# Patient Record
Sex: FEMALE
[De-identification: names, ages, dates, MRNs, and addresses within clinical notes are randomized per-mention and may not be internally consistent; named-entity substitution may affect disease eponyms.]

---

## 2022-05-20 ENCOUNTER — HOSPITAL ENCOUNTER (INPATIENT)
Dept: HOSPITAL 47 - 3MHU | Age: 37
LOS: 5 days | Discharge: HOME | DRG: 885 | End: 2022-05-25
Attending: PSYCHIATRY & NEUROLOGY | Admitting: PSYCHIATRY & NEUROLOGY
Payer: MEDICAID

## 2022-05-20 DIAGNOSIS — F79: ICD-10-CM

## 2022-05-20 DIAGNOSIS — F17.210: ICD-10-CM

## 2022-05-20 DIAGNOSIS — Z28.310: ICD-10-CM

## 2022-05-20 DIAGNOSIS — Z53.20: ICD-10-CM

## 2022-05-20 DIAGNOSIS — Z91.51: ICD-10-CM

## 2022-05-20 DIAGNOSIS — F12.10: ICD-10-CM

## 2022-05-20 DIAGNOSIS — F14.10: ICD-10-CM

## 2022-05-20 DIAGNOSIS — Z91.19: ICD-10-CM

## 2022-05-20 DIAGNOSIS — Z91.14: ICD-10-CM

## 2022-05-20 DIAGNOSIS — F25.0: Primary | ICD-10-CM

## 2022-05-20 DIAGNOSIS — Z60.2: ICD-10-CM

## 2022-05-20 DIAGNOSIS — K21.9: ICD-10-CM

## 2022-05-20 PROCEDURE — 80061 LIPID PANEL: CPT

## 2022-05-20 PROCEDURE — 83036 HEMOGLOBIN GLYCOSYLATED A1C: CPT

## 2022-05-20 SDOH — SOCIAL STABILITY - SOCIAL INSECURITY: PROBLEMS RELATED TO LIVING ALONE: Z60.2

## 2022-05-21 LAB
CHOLEST SERPL-MCNC: 194 MG/DL (ref 0–200)
HDLC SERPL-MCNC: 26.3 MG/DL (ref 40–60)
LDLC SERPL CALC-MCNC: 138.7 MG/DL (ref 0–131)
TRIGL SERPL-MCNC: 145 MG/DL (ref 0–149)
VLDLC SERPL CALC-MCNC: 29 MG/DL (ref 5–40)

## 2022-05-21 RX ADMIN — PANTOPRAZOLE SODIUM SCH MG: 40 TABLET, DELAYED RELEASE ORAL at 08:43

## 2022-05-21 NOTE — P.CONS
History of Present Illness





- Reason for Consult


Consult date: 05/20/22





- History of Present Illness





Patient is a 36-year-old female, transferred from [facility] who was seen in

the mental health.  The patient was paranoid and not forthcoming with 

information.  She reports having a history of psychosis and that she is 

currently trying to get stabilized on her medications.  She denied physical 

complaints at the time of interview.  Denied chest discomfort, shortness of 

breath, fever, chills, cough, nausea, vomiting, abdominal pain, diarrhea.  

Reports using one pack of cigarettes daily.  Denied substance use.





Review of systems:


Pertinent positives and negatives as discussed in HPI, a complete review of 

systems was performed and all other systems are negative.





Physical examination:


General: non toxic, no distress, appears at stated age, normal weight


Derm: Refused examination


Head: Refused examination


Eyes: Refused examination


ENT: Refused examination


Neck: Refused examination


Mouth: no lip lesion


Cardiovascular: Refused examination


Lungs: Refused examination


Abdominal: Refused examination


Ext: Refused examination


Neuro: Refused examination


Psych: Paranoid





Assessment/plan





GERD


-Continue with Protonix





Tobacco abuse


-Nicotine patch when necessary


-Advised on importance of cessation





Psychosis


-As per psychiatry





Thank you for allowing us to participate in the care of this patient.  We will 

follow peripherally.  Do not hesitate to contact us with questions.  Someone can

be reached from the [facility] hospitalist group at all hours of the day 

at [phone].





Medications and Allergies


                                    Allergies











Allergy/AdvReac Type Severity Reaction Status Date / Time


 


No Known Allergies Allergy   Verified 05/20/22 19:30














Physical Exam


Vitals: 


                                Intake and Output











 05/20/22 05/20/22 05/20/22





 06:59 14:59 22:59


 


Other:   


 


  Weight   78.6 kg

## 2022-05-21 NOTE — P.HP
Psychiatric H&P





- .


H&P Date: 05/21/22


History & Physical: 


IDENTIFYING DATA: She is a 36-year-old single  female who has a history

of a schizophrenia.  She presented involuntarily on transfer from [facility].





HISTORY OF PRESENT ILLNESS: The accompanying petition read "paranoid delusions 

and tangential thoughts.  Has been noncompliant with medications, has history of

schizophrenia."  I reviewed the medical record and interviewed the patient.  She

was anxious, paranoid and guarded.  She appeared to have difficulty explaining 

the circumstances that led to her hospitalizations.  Apparently she called 

emergency services from her apartment because she was feeling unwell.  She gave 

a disjointed history about not being able to tolerate Abilify, taking Haldol and

at some point stopping the Haldol.  She appeared become more anxious and guarded

as as I ask questions to clarify her history.





She told the EPS nurse that she had been mixing alcohol with her psychotropic 

medication and has been more paranoid than normal.  She felt that "people were 

following her" and that she had "walk to the hospital get help."





During the interview she denied that she was drinking alcohol or drinking 

alcohol with her psychotropic medications. 





She perseverated about obtaining transportation so that she could return home 

from the hospital.  She is also worried about losing her recent job at a local 

[employer].  She is afraid that if she loses his job she would not be 

able to pay her rent and may lose her apartment.





Sshe express feelings of depression but denied feeling hopeless or worthless.  

She was anxious but denied persistent uncontrolled anxiety.  She was extremely 

guarded about her psychotic symptoms and would not talk about whether or not she

was experiencing hallucinations, ideas reference, thought insertion or thought 

control.





PAST PSYCHIATRIC HISTORY: She has a history of schizophrenia with onset in the 

early 20s.  She has had approximately 5 psychiatric hospitalizations; the last 

was in September 2018.  She is active with [facility].  She became increasingly anxious and paranoid when I inquired about 

history of suicide attempts or gestures.





PAST MEDICAL HISTORY: Denied a history of major medical problems.





ALLERGIES: Known drug ALLERGIES





SUBSTANCE USE HISTORY: She is a history of alcohol use problems but alleges that

she is spent abstinent "for a while."  She was in one rehab program and was 

prescribed Antabuse.  She denied use of drugs.





FAMILY PSYCHIATRIC/SUBSTANCE USE HISTORY: She is unaware of a family history of 

mental illness





LEGAL HISTORY: Denied





SOCIAL HISTORY: Her parents  when she was 10 years old.  She is raised by

her mother.  She is estranged from her father and her older brother.  She is 

single and had one child out of wedlock.  She lost custody of the child was [age] 

year old.  She lives alone in an apartment.  She does not receive Social 

Security income.  She receives financial support from her mother.





MENTAL STATUS EXAM: She presented as a moderately obese 36-year-old  

female who is anxious, guarded and suspicious.  She made intermittent eye 

contact and frequently scanned the room as though she were responding to 

internal stimuli.  She is able to concentrate and attend to the interview.  She 

had no prominent physical abnormalities.  She is anxious facial expression.  She

was alert and oriented to person, place and time.  She had psychomotor 

retardation but no abnormal involuntary movements.  Her speech was spontaneous 

with a marked decrease in rate, volume and rhythm.  Her affect was paranoid and 

anxious.  She denied suicidal ideation and death wishes.  She denied homicidal 

ideation.  She did not express feelings of hopelessness, helplessness or 

worthlessness.  She ruminated about this hospitalization, transportation home 

and the status of her employment.  She did not express clear ideas of reference 

or delusional thoughts.  She did not express paranoid ideation.  Her thinking 

was very concrete but her associations are goal-directed.  At times she appeared

to be blocking.  She denied hallucinations but appeared to be responding to 

internal stimuli.  Global impression of intellect is average.  She is aware of 

illness and need for treatment.





STRENGTHS: Stable housing, supportive family, good physical health, engagement 

with community mental health services





WEAKNESSES: Chronic mental illness, questionable compliance with psychotropic 

medications, possible alcohol use problems





IMPRESSION: She is a 36-year-old single  female who has a history of 

schizophrenia.  She presented to the psychiatric unit involuntarily on transfer 

from [facility] emergency room where she presented anxious, paranoid and 

delusional.  She was obviously paranoid and was not forthcoming about her 

symptoms.  I suspect she is having an exacerbation of her illness likely related

to noncompliance with treatment.  Alcohol might be compounding factor.  She'll 

benefit from continued inpatient treatment with combination of 

psychopharmacology and multimodal therapy.





PRINCIPLE DIAGNOSIS: Schizophrenia chronic multiple hospitalizations with acute 

exacerbation, rule out alcohol use disorder





RECOMMENDATION: Admit voluntarily to the psychiatric unit.  Safety precautions. 

Consult medicine for initial physical 7 medical history.   

completed initial psychosocial assessment coordinate discharge and aftercare 

services.  Continue Haldol 10 mg daily, Haldol and/or Ativan when necessary IM 

for anxiety, agitation and increased psychosis.  Obtain collateral information. 

Encourage participation in therapeutic groups and activities.  Evaluate clinical

status response to treatment daily basis.





                                    Allergies











Allergy/AdvReac Type Severity Reaction Status Date / Time


 


No Known Allergies Allergy   Verified 05/20/22 19:30








                                   Vital Signs











Temp  98.9 F   05/21/22 06:56


 


Pulse  107 H  05/21/22 06:56


 


Resp      


 


BP  128/77   05/21/22 06:56


 


Pulse Ox  98   05/21/22 06:56








                                 Intake & Output











 05/20/22 05/21/22 05/21/22





 18:59 06:59 18:59


 


Weight  78.6 kg 











05/21/22 11:43

## 2022-05-22 VITALS — RESPIRATION RATE: 14 BRPM

## 2022-05-22 RX ADMIN — PANTOPRAZOLE SODIUM SCH MG: 40 TABLET, DELAYED RELEASE ORAL at 09:26

## 2022-05-22 NOTE — P.PN
Progress Note - Text


Progress Note Date: 05/22/22


Clinical Problems: Clinical Problems: Schizoaffective disorder bipolar type, 

cannabis use disorder, intellectual disability





Interim history:  I reviewed the medical record and interviewed the patient.  

The patient denied problems or concerns and requested discharge.  She talked 

about wanting to be discharged with the male patient who was recently admitted. 

She was guarded and suspicious but denied such psychotic symptoms as 

hallucinations, ideas reference thought insertion etc.  Show no behavioral 

problems or episodes of behavioral dyscontrol.  She is attending therapeutic 

groups and activities.  She slept only 2 hours last night.





Mental status exam: She presented as a tall AND groomed  female who was

pleasant on approach.  She made eye contact and appeared to attend to the 

interview.  She had a blunted facial expression.  She showed no abnormality of 

psychomotor activity.  Her speech was spontaneous with decreased rate, rhythm 

and volume.  She was guarded and suspicious and scanned the room during the 

interview.  She denied suicidal ideation and death wishes.  She denied 

experiencing depressive cognitions.  She did not express clear ideas reference 

or paranoid ideation.  Her thinking was concrete and associations appeared goal 

directed.  She denied hallucinations but appeared to be responding to internal 

stimuli.





Assessment: She has no insight or understanding of her illness and need for 

treatment.  She continues to have symptoms of psychosis with paranoia and 

possible auditory hallucinations.





Plan: Continue inpatient treatment.  Safety precautions.  Continue Haldol 10 mg 

daily and Haldol 5 mg by mouth or IM agitation, aggression acute psychosis.  

Encourage continued participation in therapeutic groups and activities.  

Evaluate clinical status response to treatment daily basis.

## 2022-05-23 RX ADMIN — PANTOPRAZOLE SODIUM SCH MG: 40 TABLET, DELAYED RELEASE ORAL at 08:48

## 2022-05-23 NOTE — P.PN
Progress Note - Text


Progress Note Date: 05/23/22





Interval History:


Patient was seen wandering the hallways and was directable and agreeable to 

speak with writer in the office. Currently, the patient is presenting as quite 

tearful during the psychiatric interview.  The patient states that she is upset 

because she has been thinking about her daughter whom she lost custody of.  She 

reports that her daughter's 3 years old.  The patient states that she was 

brought to the hospital for paranoid delusions.  She reports that these are 

well-controlled at this time.  She does admit that she had some paranoid 

thoughts that her neighbor was present on the psychiatric unit.  She is 

currently denying any suicidal or homicidal ideation, intention, and/or plan.  

She is not reporting any auditory or visual hallucinations.  She denies any 

issues regarding her sleep or her appetite.


The patient does report a significant history of substance use.  She reports 

that she uses cocaine approximately twice per week.  She was counseled at length

that this may contribute to her psychotic symptoms and paranoia. Despite 

tachycardia, the patient does not endorse any chest pain, shortness of breath, 

or palpitations.





As per team meeting discussion, the patient reportedly endorse significant 

paranoid delusions while attending group.  She reportedly believed that the 

 running group was involved in taking away the patient's daughter 

and was quite tearful and distraught.  She expresses a desire not to interact 

with the .





Mental Status Exam:


General Appearance: Patient appears to be stated age is alert, directable, and 

cooperative. 


Behavior: Patient is calmly seated without any agitated behavior.  Tearful 

throughout the interview.


Speech: Patient's speech is fluent and nonpressured. 


Mood/Affect: Mood is "sad." Affect is tearful. Labile from happy to sad.   


Suicidality/Homicidality:  Patient denies having any suicidal or homicidal 

ideation, intention,/or plan.


Perceptions: Patient denies any visual hallucinations and denies any auditory 

hallucinations  


Though content/process: The patient does endorse paranoid delusional thought 

content and displayed this during group therapy.  Thought process is goal 

oriented.


Memory and concentration: AOX3, grossly intact for the purposes of this session


Judgment and insight: Poor





                                   Vital Signs











Temp  96.5 F L  05/22/22 00:23


 


Pulse  111 H  05/22/22 00:23


 


Resp  14   05/22/22 00:23


 


BP  140/87   05/22/22 00:23


 


Pulse Ox  98   05/21/22 06:56


 


FiO2      














Assessment


Schizophrenia


Cocaine use disorder





Plan:


-Patient continues to meet criteria for inpatient psychiatric admission for 

symptom stabilization and safety. Patient has signed adult voluntary form and 

medication consent and was placed in patient's chart.


-Medications: 


Increase Haldol to 10 mg by mouth every morning and 5 mg by mouth daily at 

bedtime for management of psychosis.


-When necessary Ativan and Haldol for agitation/aggression.


-SW on board for discharge planning.  Encouraged the patient to participate in 

milieu.

## 2022-05-24 VITALS — SYSTOLIC BLOOD PRESSURE: 141 MMHG | DIASTOLIC BLOOD PRESSURE: 83 MMHG | TEMPERATURE: 98.7 F | HEART RATE: 89 BPM

## 2022-05-24 RX ADMIN — PANTOPRAZOLE SODIUM SCH MG: 40 TABLET, DELAYED RELEASE ORAL at 08:44

## 2022-05-24 NOTE — P.PN
Progress Note - Text


Progress Note Date: 05/24/22








Interval History:


Patient was seen wandering the hallways and was directable and agreeable to 

speak with writer in the office.  Currently, the patient is not reporting any 

suicidal or homicidal ideation, intention, and/or plan.  She is not reporting 

any auditory or visual hallucinations.  She is denying any paranoia or other 

delusions.  Patient has also had a significant decrease in her crying episodes. 

She maintains that she only cries when she is thinking about her daughter.  The 

patient is requesting for discharge.  She expresses no delusional thought 

content today and states that she had no delusional thoughts towards 


the [place] (SW who ran group yesterday) and instead only felt that the social 

worker reminded her of the  who worked on the case that led to her 

daughter being taken away.





Mental Status Exam:


General Appearance: Patient appears to be stated age is alert, directable, and 

cooperative. 


Behavior: Patient is calmly seated without any agitated behavior. 


Speech: Patient's speech is fluent and nonpressured. 


Mood/Affect: Mood is "feeling good" Affect is constricted.


Suicidality/Homicidality:  Patient denies having any suicidal or homicidal 

ideation, intention,/or plan.


Perceptions: Patient denies any visual hallucinations and denies any auditory 

hallucinations  


Though content/process: The patient does endorse paranoid delusional thought 

content and displayed this during group therapy.  Thought process is goal 

oriented.


Memory and concentration: AOX3, grossly intact for the purposes of this session


Judgment and insight: Poor





                                   Vital Signs











Temp  98.7 F   05/24/22 07:22


 


Pulse  89   05/24/22 07:22


 


Resp  14   05/22/22 00:23


 


BP  141/83   05/24/22 07:22


 


Pulse Ox  99   05/24/22 07:22


 


FiO2      

















Assessment


Schizophrenia


Cocaine use disorder





Plan:


-Patient continues to meet criteria for inpatient psychiatric admission for 

symptom stabilization and safety. Patient has signed adult voluntary form and 

medication consent and was placed in patient's chart.


-Medications: 


Continue Haldol 10 mg by mouth every morning and 5 mg by mouth daily at bedtime 

for management of psychosis.  We discussed the possibility of transition to a 

long-acting injectable.  Patient is currently denying need for this at this 

time.


-When necessary Ativan and Haldol for agitation/aggression.


-SW on board for discharge planning.  Encouraged the patient to participate in 

milieu.

## 2022-05-25 RX ADMIN — PANTOPRAZOLE SODIUM SCH MG: 40 TABLET, DELAYED RELEASE ORAL at 07:56

## 2022-05-25 NOTE — P.DS
Providers


Date of admission: 


05/20/22 19:00





Expected date of discharge: 05/25/22


Attending physician: 


Gonsalo Sahu MD





Consults: 





                                        





05/20/22 19:31


Consult Physician Routine 


   Consulting Provider: [facility]


   Consult Reason/Comments: medical management


   Do you want consulting provider notified?: Yes











Primary care physician: 


Stated None








- Discharge Diagnosis(es)


(1) Schizophrenia


Current Visit: Yes   Status: Acute   





(2) Cocaine use disorder


Current Visit: Yes   Status: Chronic   Priority: Medium   


Hospital Course: 





Admission HPI:


Initial psychiatric evaluation was completed by Dr Munoz on 05/21/2022 who 

wrote:


"She is a 36-year-old single  female who has a history of a 

schizophrenia.  She presented involuntarily on transfer from [facility].





The accompanying petition read "paranoid delusions and tangential thoughts.  Has

been noncompliant with medications, has history of schizophrenia."  I reviewed 

the medical record and interviewed the patient.  She was anxious, paranoid and 

guarded.  She appeared to have difficulty explaining the circumstances that led 

to her hospitalizations.  Apparently she called emergency services from her 

apartment because she was feeling unwell.  She gave a disjointed history about 

not being able to tolerate Abilify, taking Haldol and at some point stopping the

Haldol.  She appeared become more anxious and guarded as as I ask questions to 

clarify her history.





She told the EPS nurse that she had been mixing alcohol with her psychotropic 

medication and has been more paranoid than normal.  She felt that "people were 

following her" and that she had "walk to the hospital get help."





During the interview she denied that she was drinking alcohol or drinking 

alcohol with her psychotropic medications. 





She perseverated about obtaining transportation so that she could return home 

from the hospital.  She is also worried about losing her recent job at a local 

[employer].  She is afraid that if she loses his job she would not be 

able to pay her rent and may lose her apartment.





Sshe express feelings of depression but denied feeling hopeless or worthless.  

She was anxious but denied persistent uncontrolled anxiety.  She was extremely 

guarded about her psychotic symptoms and would not talk about whether or not she

was experiencing hallucinations, ideas reference, thought insertion or thought 

control.





She has a history of schizophrenia with onset in the early 20s.  She has had 

approximately 5 psychiatric hospitalizations; the last was in September 2018.  

She is active with [facility].  She became 

increasingly anxious and paranoid when I inquired about history of suicide 

attempts or gestures."





Hospital course:


Upon admission to the unit patient was initially noted to be anxious, guarded, 

and suspicious.  She appeared to respond to internal stimuli. Patient was 

however directable and agreeable to commence treatment. Patient got along well 

with other patients on the unit and followed unit protocol.  Patient was 

compliant with the medications and denied any side effects throughout hospital 

course.  Patient was started on Haldol for management of acute psychosis and 

agitation.  Patient spoke of her stressors and engaged in therapy both group and

individual.  Patient was also seen by medical team for history and physical e

xam. At times the patient would cry uncontrollably and also expressed that 

people on the psychiatric unit or somehow involved in her losing custody of her 

child.  As Haldol was gradually titrated, the patient displayed significant 

improvement in regards to the target symptoms of psychosis and mood lability.  

On the day of discharge, the patient does not report any suicidal or homicidal 

ideation, intention, and/or plan.  She denies any access to firearms or other 

weapons.  She reports no auditory or visual hallucinations.  She denies any 

paranoia or other delusions.  The patient remains future and goal oriented.  The

patient does have a significant history of substance abuse however was counseled

on abstaining from all substances including alcohol, marijuana, cocaine, and 

other drugs.  The patient was counseling from the importance of medication 

adherence appropriate outpatient follow-up.  Prior to discharge, family meeting 

will be arranged by  to answer any questions and ensure safety.





Mental status exam:


General Appearance: Patient appears to be stated age is alert, pleasant, and 

cooperative. Patient is in no acute distress and has fair hygiene and grooming 


Behavior: Patient is calmly seated without any agitated behavior.


Speech: Patient's speech is fluent and nonpressured. 


Mood/Affect: Patient reports their mood is "doing good", affect is congruent and

euthymic. 


Suicidality/Homicidality:  Patient denies having any suicidal or homicidal 

ideation intent or plan.  


Perceptions: Patient denies any auditory or visual hallucinations.  


Though content/process: There is no evidence of any delusional thought content 

and thought process is linear and goal-directed.  She is future oriented.


Memory and concentration: AOX3, grossly intact for the purposes of this session.

Can spell "WORLD" backwards correctly.


Judgment and insight: Improved with guarded prognosis








                                   Vital Signs











Temp  98.7 F   05/24/22 07:22


 


Pulse  89   05/24/22 07:22


 


Resp  14   05/22/22 00:23


 


BP  141/83   05/24/22 07:22


 


Pulse Ox  99   05/24/22 07:22


 


FiO2      














Impression:


Schizophrenia


Cocaine use disorder





Plan:


-Continue with discharge today as patient has improved and stabilized 

psychiatrically and is not currently an imminent threat to herself and/or 

others. Patient will remain at chronically elevated risk for harm to self and/or

others due to her severity of mental illness and her substance abuse.


-Continue medications: 


Haldol 10 mg by mouth every morning and 5 mg by mouth daily at bedtime for 

psychosis.


-Patient was counseled on the need for medication compliance and appropriate 

follow-up at mental health and also primary care for medical issues.  Patient 

verbalized understanding and agreed.


-Social work to arrange for and conduct family meeting to ensure safety upon 

discharge and answer any questions/concerns. Social work also to arrange for 

patients follow up appointments with [facility] for psychiatric care along with follow 

up with primary care provider.


-Patient counseled on abstaining from recreational drugs and marijuana and 

alcohol. Was informed/educated on the adverse effects on their physical and 

mental health. Patient verbally agreed and understood. Patient was offered 

substance abuse treatment however declined at this time.


-Patient was instructed to return to the hospital or seek immediate medical care

if their psychiatric or medical symptoms do worsen or reoccur.


-Psychoeducation and supportive therapy provided to patient.  Risks and benefits

of pharmacological treatment versus the risks and benefits of nontreatment 

weight and discussed.  Informed consent discussion held.  Common side effects of

psychotropics discussed such as, but not limited to headache, GI disturbance, 

sexual dysfunction, movement disorders, sedation, and orthostatic hypotension.  

Life threatening and blackbox warnings of prescribed medications also discussed.

 Potential risks of operating a vehicle or heavy machinery discussed with 

patient at length.  Advised on importance of compliance and a reliable and 

responsible manner. Patient advised to review FDA consumer labeling of all 

medications prior to taking.  Patient verbalized understanding of potential 

risks, and agrees with current treatment plan.  Patient advised to medically 

contact physician/emergency personnel if any acute changes in condition occur.








                                    Allergies











Allergy/AdvReac Type Severity Reaction Status Date / Time


 


No Known Allergies Allergy   Verified 05/20/22 19:30














                               Laboratory Results











Estimated Ave Glu mg/dL  137   05/21/22  10:02    


 


Hemoglobin A1c  6.4 % (0.0-6.0)  H  05/21/22  10:02    


 


Triglycerides  145.00 mg/dL (0..00)   05/21/22  10:02    


 


Cholesterol  194.00 mg/dL (0..00)   05/21/22  10:02    


 


LDL Cholesterol, Calc  138.7 mg/dL (0.0-131.0)  H  05/21/22  10:02    


 


VLDL Cholesterol, Calc  29.00 mg/dL (5.00-40.00)   05/21/22  10:02    


 


HDL Cholesterol  26.30 mg/dL (40.00-60.00)  L  05/21/22  10:02    


 


Cholesterol/HDL Ratio  7.38 Ratio  05/21/22  10:02    











Patient Condition at Discharge: Stable





Plan - Discharge Summary


New Discharge Prescriptions: 


New


   haloperidoL [Haldol] 5 mg PO HS 30 Days  tab


   haloperidoL [Haldol] 10 mg PO DAILY 30 Days  tab


Discharge Medication List





haloperidoL [Haldol] 5 mg PO HS 30 Days  tab 05/25/22 [Rx]


haloperidoL [Haldol] 10 mg PO DAILY 30 Days  tab 05/25/22 [Rx]








Follow up Appointment(s)/Referral(s): 


NEVAEH Ray [Other] - 06/17/22 9:30 am


[facility], [facility] [Other] - 1 Week


Patient Instructions/Handouts:  Schizophrenia (DC)


Activity/Diet/Wound Care/Special Instructions: 


Activity and diet as tolerated. Avoid the use of street drugs and alcohol. Take 

all medications as prescribed. When you are in need of refills on your 

medications please contact your medical provider and/or outpatient psychiatrist 

to have this done. Please go to scheduled outpatient appointment for aftercare 

treatment. If symptoms return or become worse, call the crisis line at 

[phone] and/or go to the nearest emergency room for evaluation


Discharge Disposition: HOME SELF-CARE